# Patient Record
Sex: FEMALE | Race: WHITE | NOT HISPANIC OR LATINO | URBAN - METROPOLITAN AREA
[De-identification: names, ages, dates, MRNs, and addresses within clinical notes are randomized per-mention and may not be internally consistent; named-entity substitution may affect disease eponyms.]

---

## 2017-03-25 ENCOUNTER — OUTPATIENT (OUTPATIENT)
Dept: OUTPATIENT SERVICES | Facility: HOSPITAL | Age: 42
LOS: 1 days | Discharge: HOME | End: 2017-03-25

## 2017-06-27 DIAGNOSIS — E55.9 VITAMIN D DEFICIENCY, UNSPECIFIED: ICD-10-CM

## 2017-06-27 DIAGNOSIS — E03.9 HYPOTHYROIDISM, UNSPECIFIED: ICD-10-CM

## 2017-06-27 DIAGNOSIS — D75.9 DISEASE OF BLOOD AND BLOOD-FORMING ORGANS, UNSPECIFIED: ICD-10-CM

## 2017-06-27 DIAGNOSIS — N30.00 ACUTE CYSTITIS WITHOUT HEMATURIA: ICD-10-CM

## 2017-06-27 DIAGNOSIS — Z13.9 ENCOUNTER FOR SCREENING, UNSPECIFIED: ICD-10-CM

## 2017-06-27 DIAGNOSIS — E11.9 TYPE 2 DIABETES MELLITUS WITHOUT COMPLICATIONS: ICD-10-CM

## 2017-06-27 DIAGNOSIS — Z00.01 ENCOUNTER FOR GENERAL ADULT MEDICAL EXAMINATION WITH ABNORMAL FINDINGS: ICD-10-CM

## 2017-06-27 DIAGNOSIS — E78.5 HYPERLIPIDEMIA, UNSPECIFIED: ICD-10-CM

## 2018-08-16 PROBLEM — Z00.00 ENCOUNTER FOR PREVENTIVE HEALTH EXAMINATION: Status: ACTIVE | Noted: 2018-08-16

## 2018-09-12 ENCOUNTER — APPOINTMENT (OUTPATIENT)
Dept: UROLOGY | Facility: CLINIC | Age: 43
End: 2018-09-12
Payer: COMMERCIAL

## 2018-09-12 VITALS
SYSTOLIC BLOOD PRESSURE: 108 MMHG | HEART RATE: 89 BPM | BODY MASS INDEX: 29.99 KG/M2 | WEIGHT: 180 LBS | DIASTOLIC BLOOD PRESSURE: 71 MMHG | HEIGHT: 65 IN

## 2018-09-12 DIAGNOSIS — Z78.9 OTHER SPECIFIED HEALTH STATUS: ICD-10-CM

## 2018-09-12 LAB
BILIRUB UR QL STRIP: NORMAL
CLARITY UR: CLEAR
COLLECTION METHOD: NORMAL
GLUCOSE UR-MCNC: NORMAL
HCG UR QL: 2 MG/DL
HGB UR QL STRIP.AUTO: 250
KETONES UR-MCNC: NORMAL
LEUKOCYTE ESTERASE UR QL STRIP: NORMAL
NITRITE UR QL STRIP: NORMAL
PH UR STRIP: 5
PROT UR STRIP-MCNC: NORMAL
SP GR UR STRIP: 1.02

## 2018-09-12 PROCEDURE — 81003 URINALYSIS AUTO W/O SCOPE: CPT | Mod: QW

## 2018-09-12 PROCEDURE — 99213 OFFICE O/P EST LOW 20 MIN: CPT

## 2018-10-24 ENCOUNTER — APPOINTMENT (OUTPATIENT)
Dept: UROLOGY | Facility: CLINIC | Age: 43
End: 2018-10-24

## 2018-12-18 ENCOUNTER — APPOINTMENT (OUTPATIENT)
Dept: UROLOGY | Facility: CLINIC | Age: 43
End: 2018-12-18
Payer: COMMERCIAL

## 2018-12-18 VITALS — HEIGHT: 65 IN | WEIGHT: 180 LBS | BODY MASS INDEX: 29.99 KG/M2

## 2018-12-18 PROCEDURE — 99213 OFFICE O/P EST LOW 20 MIN: CPT

## 2019-02-28 ENCOUNTER — APPOINTMENT (OUTPATIENT)
Age: 44
End: 2019-02-28

## 2019-02-28 ENCOUNTER — RESULT CHARGE (OUTPATIENT)
Age: 44
End: 2019-02-28

## 2019-02-28 ENCOUNTER — OUTPATIENT (OUTPATIENT)
Dept: OUTPATIENT SERVICES | Facility: HOSPITAL | Age: 44
LOS: 1 days | Discharge: HOME | End: 2019-02-28

## 2019-02-28 VITALS
HEART RATE: 70 BPM | DIASTOLIC BLOOD PRESSURE: 70 MMHG | HEIGHT: 65 IN | BODY MASS INDEX: 29.99 KG/M2 | WEIGHT: 180 LBS | SYSTOLIC BLOOD PRESSURE: 112 MMHG

## 2019-02-28 DIAGNOSIS — N39.46 MIXED INCONTINENCE: ICD-10-CM

## 2019-02-28 DIAGNOSIS — Z87.448 PERSONAL HISTORY OF OTHER DISEASES OF URINARY SYSTEM: ICD-10-CM

## 2019-02-28 DIAGNOSIS — R15.9 FULL INCONTINENCE OF FECES: ICD-10-CM

## 2019-02-28 DIAGNOSIS — Z83.3 FAMILY HISTORY OF DIABETES MELLITUS: ICD-10-CM

## 2019-02-28 DIAGNOSIS — Z87.898 PERSONAL HISTORY OF OTHER SPECIFIED CONDITIONS: ICD-10-CM

## 2019-02-28 LAB
BILIRUB UR QL STRIP: NORMAL
CLARITY UR: CLEAR
COLLECTION METHOD: NORMAL
GLUCOSE UR-MCNC: NORMAL
HCG UR QL: NORMAL EU/DL
HGB UR QL STRIP.AUTO: NORMAL
KETONES UR-MCNC: NORMAL
LEUKOCYTE ESTERASE UR QL STRIP: NORMAL
NITRITE UR QL STRIP: NORMAL
PH UR STRIP: 5
PROT UR STRIP-MCNC: NORMAL
SP GR UR STRIP: 1.01

## 2019-02-28 RX ORDER — IMIPRAMINE HYDROCHLORIDE 25 MG/1
25 TABLET, FILM COATED ORAL TWICE DAILY
Qty: 60 | Refills: 5 | Status: DISCONTINUED | COMMUNITY
Start: 2018-09-12 | End: 2019-02-28

## 2019-02-28 NOTE — HISTORY OF PRESENT ILLNESS
[FreeTextEntry1] : \par Pt with pelvic floor dysfunction here for urogynecologic evaluation. She describes: \par Referring provider: Dr Trevino\par PCP: Dr Jamar Antoine\par \par Chief PFD: Urinary incontinence\par \par Pelvic organ prolapse: s/p c/s x 1 with cystotomy repair, no bulge, no splinting\par Stress urinary incontinence: daily episodes with coughing, sneezing, laughing. \par Overactive bladder syndrome: s/p imipramine (sleepiness), q1h voids, leakage and pain when she attempts to hold urine, denies glaucoma. MYLES: U > S.\par Voiding dysfunction: denies Incomplete bladder emptying, denies hesitancy \par Lower urinary tract/vaginal symptoms: 0 UTIs per year, denies hematuria, denies dysuria, denies bladder pain \par Fecal incontinence: diet dependent, occasionally when she had soft stool, improving\par Defecatory dysfunction: sausage\par Sexual dysfunction: Sexually active. Denies pain and leakage with intercourse.\par Pelvic pain: denies\par Vaginal dryness: denies\par \par Her pelvic floor symptoms are significantly bothersome and negatively impacting her quality of life. \par \par

## 2019-02-28 NOTE — DISCUSSION/SUMMARY
[FreeTextEntry1] : \par Mixed Incontinence:\par The pathophysiology of overactive bladder was discussed with the patient. The patient was given information and education on pelvic floor muscle exercises/rehabilitation, avoidance of dietary bladder irritants, and other strategies to improve bladder control, such as scheduled voiding. She was counseled regarding further management strategies for overactive bladder and urge incontinence including pelvic floor physical therapy, medications or surgical management. The patient voiced understanding and agrees with the plan for diet changes and will consider further options. We will follow up on her urine tests.\par \par  The management options for stress incontinence were discussed including observation, pessary placement, pelvic floor physical therapy or surgery.  The patient voiced understanding and will consider her options.\par \par Fecal Incontinence:\par The patient was counseled about her fecal incontinence.  We discussed the importance of dietary fiber, hydration, physical activity, pelvic floor rehabilitation medication and minimally invasive surgical procedures.  She was given a handout with specific information about dietary fiber and ways to relieve fecal incontinence as well as discussed pelvic floor rehabilitation. If her symptoms do not respond, we will consider interstim or peripheral tibial nerve stimulation.\par \par \par

## 2019-02-28 NOTE — COUNSELING
[FreeTextEntry1] : \par We will contact you if the urine results are abnormal.\par \par Please increase your daily water intake (at least 4 cups a day).\par \par Please follow our recommended bowel recipe to control your leakage of stool\par \par Make changes to your diet as listed on the handout to decrease irritation to the bladder.\par \par The management options for the leakage of urine with coughing, sneezing, laughing:\par 1) do nothing\par 2) physical therapy in [place] or [place]\par 3) pessary-device into the vagina\par 4) surgery (slng)\par \par The management options for the leakage of urine with urgency\par 1) do nothing\par 2) diet changes\par 3) physical therapy in [place] or [place]\par 4) medications\par \par Please review the options with your  and call us with your decisions about which management options you choose\par \par We will then schedule you for follow up once we know what  you want to do\par \par

## 2019-03-01 DIAGNOSIS — N39.46 MIXED INCONTINENCE: ICD-10-CM

## 2019-03-01 LAB
APPEARANCE: CLEAR
BILIRUBIN URINE: NEGATIVE
BLOOD URINE: NEGATIVE
COLOR: YELLOW
GLUCOSE QUALITATIVE U: NEGATIVE MG/DL
KETONES URINE: NEGATIVE
LEUKOCYTE ESTERASE URINE: NEGATIVE
NITRITE URINE: NEGATIVE
PH URINE: 6
PROTEIN URINE: NEGATIVE MG/DL
SPECIFIC GRAVITY URINE: 1.01
UROBILINOGEN URINE: 0.2 MG/DL (ref 0.2–?)

## 2019-03-04 LAB — BACTERIA UR CULT: NORMAL

## 2022-10-20 ENCOUNTER — OFFICE (OUTPATIENT)
Dept: URBAN - METROPOLITAN AREA CLINIC 92 | Facility: CLINIC | Age: 47
Setting detail: OPHTHALMOLOGY
End: 2022-10-20
Payer: COMMERCIAL

## 2022-10-20 DIAGNOSIS — H40.013: ICD-10-CM

## 2022-10-20 DIAGNOSIS — H25.13: ICD-10-CM

## 2022-10-20 DIAGNOSIS — H40.053: ICD-10-CM

## 2022-10-20 PROCEDURE — 92083 EXTENDED VISUAL FIELD XM: CPT | Performed by: SPECIALIST

## 2022-10-20 PROCEDURE — 76514 ECHO EXAM OF EYE THICKNESS: CPT | Performed by: SPECIALIST

## 2022-10-20 PROCEDURE — 92020 GONIOSCOPY: CPT | Performed by: SPECIALIST

## 2022-10-20 PROCEDURE — 92133 CPTRZD OPH DX IMG PST SGM ON: CPT | Performed by: SPECIALIST

## 2022-10-20 PROCEDURE — 92002 INTRM OPH EXAM NEW PATIENT: CPT | Performed by: SPECIALIST

## 2022-10-20 ASSESSMENT — KERATOMETRY
OS_AXISANGLE_DEGREES: 075
OD_AXISANGLE_DEGREES: 104
OS_K1POWER_DIOPTERS: 44.75
METHOD_AUTO_MANUAL: AUTO
OD_K2POWER_DIOPTERS: 42.25
OS_K2POWER_DIOPTERS: 46.00
OD_K1POWER_DIOPTERS: 44.25

## 2022-10-20 ASSESSMENT — REFRACTION_AUTOREFRACTION
OS_CYLINDER: +0.50
OS_SPHERE: -0.25
OD_AXIS: 135
OD_CYLINDER: +0.25
OS_AXIS: 071
OD_SPHERE: PLANO

## 2022-10-20 ASSESSMENT — SPHEQUIV_DERIVED: OS_SPHEQUIV: 0

## 2022-10-20 ASSESSMENT — PACHYMETRY
OD_CT_CORRECTION: -7
OD_CT_UM: 645
OS_CT_CORRECTION: >-7
OS_CT_UM: 664

## 2022-10-20 ASSESSMENT — VISUAL ACUITY
OS_BCVA: 20/20
OD_BCVA: 20/20

## 2022-10-20 ASSESSMENT — CONFRONTATIONAL VISUAL FIELD TEST (CVF)
OS_FINDINGS: FULL
OD_FINDINGS: FULL

## 2022-10-20 ASSESSMENT — AXIALLENGTH_DERIVED: OS_AL: 22.9227

## 2023-06-28 ENCOUNTER — APPOINTMENT (OUTPATIENT)
Dept: UROLOGY | Facility: CLINIC | Age: 48
End: 2023-06-28
Payer: COMMERCIAL

## 2023-06-28 ENCOUNTER — TRANSCRIPTION ENCOUNTER (OUTPATIENT)
Age: 48
End: 2023-06-28

## 2023-06-28 VITALS
HEIGHT: 65 IN | HEART RATE: 69 BPM | TEMPERATURE: 98 F | RESPIRATION RATE: 24 BRPM | DIASTOLIC BLOOD PRESSURE: 89 MMHG | BODY MASS INDEX: 29.99 KG/M2 | OXYGEN SATURATION: 98 % | SYSTOLIC BLOOD PRESSURE: 120 MMHG | WEIGHT: 180 LBS

## 2023-06-28 DIAGNOSIS — N28.89 OTHER SPECIFIED DISORDERS OF KIDNEY AND URETER: ICD-10-CM

## 2023-06-28 PROCEDURE — 99203 OFFICE O/P NEW LOW 30 MIN: CPT

## 2023-07-20 ENCOUNTER — APPOINTMENT (OUTPATIENT)
Dept: UROLOGY | Facility: CLINIC | Age: 48
End: 2023-07-20

## 2025-01-14 ENCOUNTER — NON-APPOINTMENT (OUTPATIENT)
Age: 50
End: 2025-01-14

## 2025-01-14 DIAGNOSIS — Z83.3 FAMILY HISTORY OF DIABETES MELLITUS: ICD-10-CM

## 2025-01-14 DIAGNOSIS — Z92.89 PERSONAL HISTORY OF OTHER MEDICAL TREATMENT: ICD-10-CM

## 2025-01-14 RX ORDER — LATANOPROST/PF 0.005 %
0.01 DROPS OPHTHALMIC (EYE)
Refills: 0 | Status: ACTIVE | COMMUNITY

## 2025-02-27 ENCOUNTER — APPOINTMENT (OUTPATIENT)
Age: 50
End: 2025-02-27
Payer: COMMERCIAL

## 2025-02-27 VITALS
SYSTOLIC BLOOD PRESSURE: 125 MMHG | DIASTOLIC BLOOD PRESSURE: 82 MMHG | WEIGHT: 181 LBS | HEIGHT: 65 IN | HEART RATE: 83 BPM | BODY MASS INDEX: 30.16 KG/M2

## 2025-02-27 DIAGNOSIS — Z01.419 ENCOUNTER FOR GYNECOLOGICAL EXAMINATION (GENERAL) (ROUTINE) W/OUT ABNORMAL FINDINGS: ICD-10-CM

## 2025-02-27 DIAGNOSIS — Z11.3 ENCOUNTER FOR SCREENING FOR INFECTIONS WITH A PREDOMINANTLY SEXUAL MODE OF TRANSMISSION: ICD-10-CM

## 2025-02-27 PROCEDURE — 99396 PREV VISIT EST AGE 40-64: CPT

## 2025-02-27 RX ORDER — SUCRALFATE 1 G/1
1 TABLET ORAL
Refills: 0 | Status: ACTIVE | COMMUNITY

## 2025-03-04 LAB
C TRACH RRNA SPEC QL NAA+PROBE: NOT DETECTED
HPV HIGH+LOW RISK DNA PNL CVX: NOT DETECTED
N GONORRHOEA RRNA SPEC QL NAA+PROBE: NOT DETECTED
SOURCE TP AMPLIFICATION: NORMAL

## 2025-03-08 LAB — CYTOLOGY CVX/VAG DOC THIN PREP: NORMAL

## 2025-03-17 ENCOUNTER — NON-APPOINTMENT (OUTPATIENT)
Age: 50
End: 2025-03-17

## 2025-03-17 DIAGNOSIS — Z98.890 OTHER SPECIFIED POSTPROCEDURAL STATES: ICD-10-CM

## 2025-03-17 DIAGNOSIS — Z78.9 OTHER SPECIFIED HEALTH STATUS: ICD-10-CM

## 2025-03-17 RX ORDER — SULFAMETHOXAZOLE AND TRIMETHOPRIM 800; 160 MG/1; MG/1
800-160 TABLET ORAL
Refills: 0 | Status: ACTIVE | COMMUNITY